# Patient Record
Sex: FEMALE | Race: BLACK OR AFRICAN AMERICAN
[De-identification: names, ages, dates, MRNs, and addresses within clinical notes are randomized per-mention and may not be internally consistent; named-entity substitution may affect disease eponyms.]

---

## 2017-03-29 ENCOUNTER — HOSPITAL ENCOUNTER (EMERGENCY)
Dept: HOSPITAL 17 - HOBED | Age: 27
Discharge: HOME | End: 2017-03-29
Payer: COMMERCIAL

## 2017-03-29 VITALS — DIASTOLIC BLOOD PRESSURE: 63 MMHG | SYSTOLIC BLOOD PRESSURE: 122 MMHG | HEART RATE: 99 BPM

## 2017-03-29 VITALS — RESPIRATION RATE: 20 BRPM

## 2017-03-29 VITALS — TEMPERATURE: 97.9 F

## 2017-03-29 DIAGNOSIS — F12.10: ICD-10-CM

## 2017-03-29 DIAGNOSIS — N85.8: ICD-10-CM

## 2017-03-29 DIAGNOSIS — O26.899: Primary | ICD-10-CM

## 2017-03-29 LAB
AMPHETAMINE, URINE: (no result)
ANION GAP SERPL CALC-SCNC: 9 MEQ/L (ref 5–15)
BACTERIA #/AREA URNS HPF: (no result) /HPF
BARBITURATES, URINE: (no result)
BUN SERPL-MCNC: 8 MG/DL (ref 7–18)
CHLAMYDIA PCR: DETECTED
CHLORIDE SERPL-SCNC: 107 MEQ/L (ref 98–107)
COCAINE UR-MCNC: (no result) NG/ML
COLOR UR: YELLOW
COMMENT (UR): (no result)
CULTURE IF INDICATED: (no result)
ERYTHROCYTE [DISTWIDTH] IN BLOOD BY AUTOMATED COUNT: 15.4 % (ref 11.6–17.2)
GFR SERPLBLD BASED ON 1.73 SQ M-ARVRAT: 149 ML/MIN (ref 89–?)
GLUCOSE UR STRIP-MCNC: (no result) MG/DL
HCO3 BLD-SCNC: 23.2 MEQ/L (ref 21–32)
HCT VFR BLD CALC: 31.8 % (ref 35–46)
HGB UR QL STRIP: (no result)
KETONES UR STRIP-MCNC: (no result) MG/DL
MCH RBC QN AUTO: 27.8 PG (ref 27–34)
MCHC RBC AUTO-ENTMCNC: 33.5 % (ref 32–36)
MCV RBC AUTO: 82.9 FL (ref 80–100)
MUCOUS THREADS #/AREA URNS LPF: (no result) /LPF
NEISSERIA PCR: NOT DETECTED
NITRITE UR QL STRIP: (no result)
PLATELET # BLD: 200 TH/MM3 (ref 150–450)
POTASSIUM SERPL-SCNC: 3.5 MEQ/L (ref 3.5–5.1)
RBC # BLD AUTO: 3.84 MIL/MM3 (ref 4–5.3)
REVIEW FLAG: (no result)
RUBELLA STATUS: (no result)
RUBV IGG SER-ACNC: 12.7 IU/ML (ref 10–500)
SODIUM SERPL-SCNC: 139 MEQ/L (ref 136–145)
SP GR UR STRIP: 1.03 (ref 1–1.03)
SQUAMOUS #/AREA URNS HPF: 20 /HPF (ref 0–5)
WBC # BLD AUTO: 8.6 TH/MM3 (ref 4–11)

## 2017-03-29 PROCEDURE — 86762 RUBELLA ANTIBODY: CPT

## 2017-03-29 PROCEDURE — 86592 SYPHILIS TEST NON-TREP QUAL: CPT

## 2017-03-29 PROCEDURE — 80074 ACUTE HEPATITIS PANEL: CPT

## 2017-03-29 PROCEDURE — 85027 COMPLETE CBC AUTOMATED: CPT

## 2017-03-29 PROCEDURE — 81001 URINALYSIS AUTO W/SCOPE: CPT

## 2017-03-29 PROCEDURE — 76805 OB US >/= 14 WKS SNGL FETUS: CPT

## 2017-03-29 PROCEDURE — 80048 BASIC METABOLIC PNL TOTAL CA: CPT

## 2017-03-29 PROCEDURE — 87491 CHLMYD TRACH DNA AMP PROBE: CPT

## 2017-03-29 PROCEDURE — 87591 N.GONORRHOEAE DNA AMP PROB: CPT

## 2017-03-29 PROCEDURE — 99284 EMERGENCY DEPT VISIT MOD MDM: CPT

## 2017-03-29 PROCEDURE — 87210 SMEAR WET MOUNT SALINE/INK: CPT

## 2017-03-29 PROCEDURE — G0481 DRUG TEST DEF 8-14 CLASSES: HCPCS

## 2017-03-29 PROCEDURE — 86703 HIV-1/HIV-2 1 RESULT ANTBDY: CPT

## 2017-03-29 PROCEDURE — 84112 EVAL AMNIOTIC FLUID PROTEIN: CPT

## 2017-03-29 PROCEDURE — 80307 DRUG TEST PRSMV CHEM ANLYZR: CPT

## 2017-03-29 NOTE — PD
HPI


Date Seen:  Mar 29, 2017 (Kel Connor MD R1)





Travel History


International Travel<30 Days:  No


Contact w/Intl Traveler<30Days:  No


Known Affected Area:  No (Kel Connor MD R1)





History of Present Illness


HPI


Patient is a 26 year old  with no prior prenatal care presents to OB ED 

with reports of LOF benita. 1 hour ago. She was seen here at [facility] on 2016, 

had a TVUS and was diagnosed with an IUP at that time dating at benita. 6 weeks. 

She was also treated at that time for asymptomatic bacteriuria urine culture 

growing E. coli she was given one dose of IV Rocephin and sent home with a 5-

day course of Macrobid. She reports +FM. Reports irregular contractions. Denies 

urinary symptoms. She also does not have a primary care physician. She is 

unsure of her LMP, she believes it to be sometime in August of last year.


Para:  2


:  3 (Kel Connor MD R1)





History


Past Medical History


Medical History:  Denies Significant Hx (Kel Connor MD R1)





Obstetric History


Obstetric History





Per EMR, prior CSx1 in , 39 weeks gestation, emergent  performed 

due to fetal bradycardia


2nd pregnancy ending in  2016 delivered at term (Kel Connor MD 

R1)





Past Surgical History


*** Narrative Surgical


Hx of CSx1


Cholecystectomy 2015 (Kel Connor MD R1)





Family History


Family History:  Negative (Kel Connor MD)





Social History


Alcohol Use:  No


Tobacco Use:  No


Substance Abuse:  Yes (Admits to marijuana use almost daily throughout pregnancy

) (Kel Connor MD R1)





Allergies-Medications


(Allergen,Severity, Reaction):  


Coded Allergies:  


     No Known Allergies (Verified , 16)


Home Meds


Active Scripts


Nitrofurantoin Monohydrate Macrocrystals (Macrobid)100 Mg Qst064 Mg PO BID  5 

Days  Ref 0


   Prov:Kirk Callahan MD         16


Prenatal Vitamin (Calna)1 Tab Tab1 Tab PO DAILY  90 Days  Ref 3


   Prov:Kirk Callahan MD         16





Review of Systems


Except as stated in HPI:  all other systems reviewed are Neg (Kel Connor MD R1)





Physical Exam


Narrative


GENERAL: Well-nourished, well-developed patient.


SKIN: Warm and dry.


HEAD: Normocephalic and atraumatic.


EYES: No scleral icterus. No injection or drainage. 


ENT: No nasal drainage noted. Mucous membranes pink. Airway patent.


NECK: Supple, trachea midline. No JVD.


CARDIOVASCULAR: Regular rate and rhythm without murmurs, gallops, or rubs. 


RESPIRATORY: Breath sounds equal bilaterally. No accessory muscle use.


ABDOMEN/GI: Abdomen soft, non-tender, bowel sounds present, no rebound, no 

guarding 


   Gravid to 24 weeks size


GENITOURINARY: 


   External Genitalia: [-]


   Cervix: [-]


   Dilatation: [-]          


   Effacement: [-]          


   Station: [-]  


   Presentation: [-]        


   Membranes: [-]


   Uterine Contractions: [-]


FHT's: 10-minute strip reviewed


   Category: I   


   Baseline: 150s 


   Reactive: yes   


   Variability: mod  


   Decels: none 


EXTREMITIES: No cyanosis or edema.


BACK: Nontender without obvious deformity. No CVA tenderness.


NEUROLOGICAL: Awake and alert. Motor and sensory grossly within normal limits. 

Normal speech. (Kel Connor MD R1)





Data


Data


Vital Signs Reviewed:  Yes (Kel Connor MD R1)





[facility]


Medical Record Reviewed:  Yes


Plan


Patient is a 26 year old  with no prior prenatal care presented with report 

of LOF now found to be at 24/1 weeks gestation based on ultrasound obtained 

today.





1. IUP


- Ultrasound obtained at OB diagnostics showing cervical length of 3.5 cm, 

posterior placenta, efw of 700 gm, GA at 24/1 weeks with an ENA of 2017


- Category I fetal tracing


- No contractions on tocometer


- Cervix is closed


- Amnisure negative


- UA shows moderate leukocyte esterase, negative nitrite, few bacteria, 30 

protein, trace ketones, neg glucose, culture not indicated


- Urine toxicology screen is positive for THC, extended screen still pending


- Wet prep negative


- GC & chlamydia obtained


- Prenatal labs ordered, pending


- Patient is instructed to follow up at [facility] for continued 

prenatal care





sdw Dr. Taveras (Kel Connor MD R1)


Attending Attestation


The exam, history, and the medical decision-making described in the above note 

were completed with the assistance of the resident provider. I reviewed and 

agree with the findings presented.  I attest that I had a face-to-face 

encounter with the patient on the same day, and personally performed and 

documented my assessment and findings in the medical record. (Kassandra Taveras MD)





Diagnosis


Diagnosis:  


 Primary Impression:  


 Intrauterine pregnancy


Disposition:  01 DISCHARGE HOME


Condition:  Stable


Patient Instructions:  General Instructions








Kel Connor MD R1 Mar 29, 2017 14:05


Kassandra Taveras MD Mar 29, 2017 16:54

## 2017-03-30 LAB — RAPID PLASMA REAGIN SCREEN: (no result)

## 2017-04-04 LAB
BATH SALTS (MDPV) UR: (no result)
ECSTASY (MDMA) UR: (no result)
HEROIN (6-ACETYLMORPHINE) UR: (no result)
K2 SPICE UR: (no result)
METHADONE UR QL SCN: (no result)
OXYCODONE (PERCODAN): (no result)
PCP UR-MCNC: (no result) UG/L

## 2017-06-06 ENCOUNTER — HOSPITAL ENCOUNTER (EMERGENCY)
Dept: HOSPITAL 17 - HOBED | Age: 27
Discharge: HOME | End: 2017-06-06
Payer: COMMERCIAL

## 2017-06-06 VITALS — HEART RATE: 123 BPM

## 2017-06-06 VITALS — HEART RATE: 110 BPM

## 2017-06-06 VITALS
DIASTOLIC BLOOD PRESSURE: 66 MMHG | HEART RATE: 98 BPM | SYSTOLIC BLOOD PRESSURE: 101 MMHG | TEMPERATURE: 98.5 F | RESPIRATION RATE: 16 BRPM

## 2017-06-06 VITALS — HEART RATE: 112 BPM

## 2017-06-06 VITALS — HEART RATE: 120 BPM

## 2017-06-06 VITALS — HEIGHT: 60 IN | BODY MASS INDEX: 29.45 KG/M2 | WEIGHT: 150 LBS

## 2017-06-06 VITALS — HEART RATE: 108 BPM

## 2017-06-06 VITALS — HEART RATE: 119 BPM

## 2017-06-06 VITALS — HEART RATE: 126 BPM

## 2017-06-06 VITALS — HEART RATE: 122 BPM

## 2017-06-06 VITALS — HEART RATE: 114 BPM

## 2017-06-06 VITALS — HEART RATE: 121 BPM

## 2017-06-06 DIAGNOSIS — O23.13: Primary | ICD-10-CM

## 2017-06-06 DIAGNOSIS — O09.33: ICD-10-CM

## 2017-06-06 DIAGNOSIS — Z3A.34: ICD-10-CM

## 2017-06-06 LAB
ALP SERPL-CCNC: 189 U/L (ref 45–117)
ALT SERPL-CCNC: 15 U/L (ref 10–53)
AMPHETAMINE, URINE: (no result)
ANION GAP SERPL CALC-SCNC: 12 MEQ/L (ref 5–15)
AST SERPL-CCNC: 19 U/L (ref 15–37)
BACTERIA #/AREA URNS HPF: (no result) /HPF
BARBITURATES, URINE: (no result)
BASOPHILS # BLD AUTO: 0 TH/MM3 (ref 0–0.2)
BASOPHILS NFR BLD: 0.5 % (ref 0–2)
BILIRUB SERPL-MCNC: 0.3 MG/DL (ref 0.2–1)
BUN SERPL-MCNC: 7 MG/DL (ref 7–18)
CHLAMYDIA PCR: DETECTED
CHLORIDE SERPL-SCNC: 103 MEQ/L (ref 98–107)
COCAINE UR-MCNC: (no result) NG/ML
COLOR UR: YELLOW
COMMENT (UR): (no result)
CULTURE IF INDICATED: (no result)
EOSINOPHIL # BLD: 0 TH/MM3 (ref 0–0.4)
EOSINOPHIL NFR BLD: 0.6 % (ref 0–4)
ERYTHROCYTE [DISTWIDTH] IN BLOOD BY AUTOMATED COUNT: 14.3 % (ref 11.6–17.2)
GFR SERPLBLD BASED ON 1.73 SQ M-ARVRAT: 149 ML/MIN (ref 89–?)
GLUCOSE UR STRIP-MCNC: (no result) MG/DL
HCO3 BLD-SCNC: 22.4 MEQ/L (ref 21–32)
HCT VFR BLD CALC: 31.6 % (ref 35–46)
HEMO FLAGS: (no result)
HGB UR QL STRIP: (no result)
HYALINE CASTS #/AREA URNS LPF: 1 /LPF
KETONES UR STRIP-MCNC: 150 MG/DL
LYMPHOCYTES # BLD AUTO: 1.1 TH/MM3 (ref 1–4.8)
LYMPHOCYTES NFR BLD AUTO: 12.5 % (ref 9–44)
MCH RBC QN AUTO: 26.4 PG (ref 27–34)
MCHC RBC AUTO-ENTMCNC: 32.8 % (ref 32–36)
MCV RBC AUTO: 80.4 FL (ref 80–100)
MONOCYTES NFR BLD: 13.8 % (ref 0–8)
MUCOUS THREADS #/AREA URNS LPF: (no result) /LPF
NEISSERIA PCR: NOT DETECTED
NEUTROPHILS # BLD AUTO: 6.3 TH/MM3 (ref 1.8–7.7)
NEUTROPHILS NFR BLD AUTO: 72.6 % (ref 16–70)
NITRITE UR QL STRIP: (no result)
PLATELET # BLD: 185 TH/MM3 (ref 150–450)
POTASSIUM SERPL-SCNC: 3.8 MEQ/L (ref 3.5–5.1)
RBC # BLD AUTO: 3.93 MIL/MM3 (ref 4–5.3)
SODIUM SERPL-SCNC: 137 MEQ/L (ref 136–145)
SP GR UR STRIP: 1.03 (ref 1–1.03)
SQUAMOUS #/AREA URNS HPF: 19 /HPF (ref 0–5)
WBC # BLD AUTO: 8.6 TH/MM3 (ref 4–11)

## 2017-06-06 PROCEDURE — 81001 URINALYSIS AUTO W/SCOPE: CPT

## 2017-06-06 PROCEDURE — 80053 COMPREHEN METABOLIC PANEL: CPT

## 2017-06-06 PROCEDURE — 86900 BLOOD TYPING SEROLOGIC ABO: CPT

## 2017-06-06 PROCEDURE — 85025 COMPLETE CBC W/AUTO DIFF WBC: CPT

## 2017-06-06 PROCEDURE — G0481 DRUG TEST DEF 8-14 CLASSES: HCPCS

## 2017-06-06 PROCEDURE — 96372 THER/PROPH/DIAG INJ SC/IM: CPT

## 2017-06-06 PROCEDURE — 87591 N.GONORRHOEAE DNA AMP PROB: CPT

## 2017-06-06 PROCEDURE — 96360 HYDRATION IV INFUSION INIT: CPT

## 2017-06-06 PROCEDURE — 80307 DRUG TEST PRSMV CHEM ANLYZR: CPT

## 2017-06-06 PROCEDURE — 59025 FETAL NON-STRESS TEST: CPT

## 2017-06-06 PROCEDURE — 87491 CHLMYD TRACH DNA AMP PROBE: CPT

## 2017-06-06 PROCEDURE — 87086 URINE CULTURE/COLONY COUNT: CPT

## 2017-06-06 PROCEDURE — 86850 RBC ANTIBODY SCREEN: CPT

## 2017-06-06 PROCEDURE — 99284 EMERGENCY DEPT VISIT MOD MDM: CPT

## 2017-06-06 PROCEDURE — 86901 BLOOD TYPING SEROLOGIC RH(D): CPT

## 2017-06-06 NOTE — PD
HPI


Chief Complaint


Contractions.


Date Seen:  2017


Travel History


International Travel<30 Days:  No


Contact w/Intl Traveler<30Days:  No





History of Present Illness


HPI


Patient is a 26 year old  at 34-0/7 weeks gestation based on second 

trimester US who presents today with contractions.  Contractions started at 5:

30 this morning and have become progressively more frequent and strong. She now 

notes contractions every 1-3 minutes lasting 30 seconds. She denies any vaginal 

bleeding or discharge. No gush or leaking of fluid. Positive fetal movement. No 

prenatal care, but she was seen in the [facility] ED twice during this pregnancy.  

She has a history of marijuana use and states that she last smoke marijuana 

yesterday.





History


Past Medical History


Medical History:  Denies Significant Hx





Obstetric History


Obstetric History


 s/p  x 1 and  x 1





Past Surgical History


*** Narrative Surgical


Cholecystectomy








Family History


Family History:  Negative





Social History


Alcohol Use:  No


Tobacco Use:  No


Substance Abuse:  Yes (patient denies, but UDS from March significant for 

marijuana use)





Allergies-Medications


(Allergen,Severity, Reaction):  


Coded Allergies:  


     No Known Allergies (Verified , 16)


Home Meds


Discontinued Scripts


Nitrofurantoin Monohydrate Macrocrystals (Macrobid)100 Mg Imd136 Mg PO BID  5 

Days  Ref 0


   Prov:Kirk Callahan MD         16


Prenatal Vitamin (Calna)1 Tab Tab1 Tab PO DAILY  90 Days  Ref 3


   Prov:Kirk Callahan MD         16





Review of Systems


Except as stated in HPI:  all other systems reviewed are Neg


General / Constitutional:  No: Fever, Chills


Eyes:  No: Visual changes


HENT:  No: Headaches


Cardiovascular:  No: Chest Pain or Discomfort


Respiratory:  No: Short of Breath


Gastrointestinal:  Abdominal Pain


Genitourinary:  Pelvic Pain,  No: Discharge, Vaginal Bleeding


Musculoskeletal:  No: Edema


Neurologic:  No: Headache


Psychiatric:  Substance Abuse





Physical Exam


Narrative


GENERAL: Well-nourished, well-developed patient.


SKIN: Warm and dry.


HEAD: Normocephalic and atraumatic.


EYES: No scleral icterus. No injection or drainage. 


ENT: No nasal drainage noted. Mucous membranes pink. Airway patent.


NECK: Supple, trachea midline. No JVD.


CARDIOVASCULAR: Regular rate and rhythm without murmurs, gallops, or rubs. 


RESPIRATORY: Breath sounds equal bilaterally. No accessory muscle use.


ABDOMEN/GI: Abdomen soft, non-tender, bowel sounds present, no rebound, no 

guarding 


   Gravid to 34 weeks size


GENITOURINARY: 


   External Genitalia: intact and normal in appearance


   BUS glands: normal


   Cervix: midposition


   Dilatation: 2          


   Effacement: 50          


   Station: -3  


   Presentation: vertex        


   Membranes: intact


   Uterine Contractions: q4-6min


FHT's: 


   Category: I   


   Baseline: 155   


   Reactive: +   


   Variability: moderate  


   Decels: none  


EXTREMITIES: No cyanosis or edema.


BACK: Nontender without obvious deformity. No CVA tenderness.


NEUROLOGICAL: Awake and alert. Motor and sensory grossly within normal limits. 

Normal speech.





Data


Data


Vital Signs Reviewed:  Yes


Orders





 Vital Signs (Adult) .ON ADMISSION (17 15:05)


^ Labor Status (17 15:05)


^ Non Stress Test (17 15:05)


^ Hydration (17 15:05)





MDM


Medical Record Reviewed:  Yes


Narrative Course / MDM


26 year old  at 34-0/7 weeks gestation.





1. IUP- Category I tracing, reassuring.


2.  contractions- monitor toco/fht. Tocolysis with Procardia (

Terbutaline contraindicated with maternal tachycardia) and IV fluids. 


3. No prenatal care- prenatal labs drawn during March ED visit. Patient was 

positive for Chlamydia in March, but states she was never treated.  Will treat 

with Rocephin and Azithromycin and repeat urine GC and chlamydia PCR.


4. Somnolence- Vitals stable. Obtain CBC, CMP, UA, UDS.





dw Dr. Valdez and Dr. VERNON Mendez R1





Addendum:


UA significant for UTI, will treat with Bactrim DS BID x 3 days.


CBC significant for Hgb 10.4, will treat with Ferrous Fumarate 325mg PO BID


UDS, CMP wnl


Contractions have subsided with Procardia. Patient will be discharged to home. 

Follow-up with the [facility], Dr. Guaman.


Diagnosis


Diagnosis:  


 Primary Impression:  


 Acute cystitis during pregnancy in third trimester


 Additional Impression:  


  contractions


Disposition:   DISCHARGE HOME


Condition:  Stable


Scripts


Ferrous Fumarate 324 Mg [id number] Mg PO BID  #60 TAB  Ref 3


   Prov:Yenny Guaman MD R2         17 


Sulfamethoxazole-Trimethoprim 800-160 Mg Tab1 Tab PO BID  #6 TAB  Ref 0


   Prov:Yenny Guaman MD R2         17








Yenny Guaman MD R2 2017 15:08
no

## 2017-06-12 LAB
BATH SALTS (MDPV) UR: (no result)
ECSTASY (MDMA) UR: (no result)
GABAPENTIN UR: (no result)
HEROIN (6-ACETYLMORPHINE) UR: (no result)
HYDROMORPHONE U: (no result)
K2 SPICE UR: (no result)
METHADONE UR QL SCN: (no result)
OXYCODONE (PERCODAN): (no result)
PCP UR-MCNC: (no result) UG/L

## 2017-06-22 ENCOUNTER — HOSPITAL ENCOUNTER (EMERGENCY)
Dept: HOSPITAL 17 - HOBED | Age: 27
Discharge: HOME | End: 2017-06-22
Payer: COMMERCIAL

## 2017-06-22 VITALS — RESPIRATION RATE: 18 BRPM

## 2017-06-22 DIAGNOSIS — O34.219: ICD-10-CM

## 2017-06-22 DIAGNOSIS — Z3A.36: ICD-10-CM

## 2017-06-22 DIAGNOSIS — O60.03: Primary | ICD-10-CM

## 2017-06-22 LAB
COLOR UR: YELLOW
COMMENT (UR): (no result)
CULTURE IF INDICATED: (no result)
GLUCOSE UR STRIP-MCNC: (no result) MG/DL
HGB UR QL STRIP: (no result)
KETONES UR STRIP-MCNC: (no result) MG/DL
MUCOUS THREADS #/AREA URNS LPF: (no result) /LPF
NITRITE UR QL STRIP: (no result)
SP GR UR STRIP: 1.04 (ref 1–1.03)
SQUAMOUS #/AREA URNS HPF: 6 /HPF (ref 0–5)

## 2017-06-22 PROCEDURE — 96375 TX/PRO/DX INJ NEW DRUG ADDON: CPT

## 2017-06-22 PROCEDURE — 96372 THER/PROPH/DIAG INJ SC/IM: CPT

## 2017-06-22 PROCEDURE — 99284 EMERGENCY DEPT VISIT MOD MDM: CPT

## 2017-06-22 PROCEDURE — 81001 URINALYSIS AUTO W/SCOPE: CPT

## 2017-06-22 PROCEDURE — 96374 THER/PROPH/DIAG INJ IV PUSH: CPT

## 2017-06-22 NOTE — PD
HPI


Chief Complaint


Contraction pain


Date Seen:  2017


Travel History


International Travel<30 Days:  No


Contact w/Intl Traveler<30Days:  No


Known Affected Area:  No





History of Present Illness


HPI


The patient is 26-year-old black female  previous  at 36 weeks by 

24 week ultrasound who has very limited prenatal care presenting brought in by 

the ambulance for contractions.  She denies bleeding or ruptured membranes.  

Baby is active.  Fetal heart rate tracing is reactive.  And she is matty 

irregularly.  We have the copy of the 24 week ultrasound done at the end of 

March here and also that time prenatal labs drawn which was also normal limits 

her chlamydia was positive and that was treated.


Para:  2


:  3





History


Obstetric History


Obstetric History


1  and 1  delivery


She has no prenatal care with this baby   only the ER visits and ultrasound 

done through [facility]





Past Surgical History


*** Narrative Surgical


1 





Social History


Alcohol Use:  No


Tobacco Use:  No


Substance Abuse:  No





Allergies-Medications


(Allergen,Severity, Reaction):  


Coded Allergies:  


     No Known Allergies (Verified , 16)


Home Meds


Active Scripts


Ferrous Fumarate 324 Mg [id number] Mg PO BID  #60 TAB  Ref 3


   Prov:Yenny Guaman MD R2         17


Sulfamethoxazole-Trimethoprim 800-160 Mg Tab1 Tab PO BID  #6 TAB  Ref 0


   Prov:Yenny Guaman MD R2         17





Review of Systems


General / Constitutional:  No: Fever, Weight Gain, Chills, Other


Eyes:  No: Diploplia, Blurred Vision, Visual changes, Pain, Photophobia


HENT:  No: Headaches, Vertigo, Lightheadedness


Cardiovascular:  No: Irregular Rhythm, Chest Pain or Discomfort, Palpitations, 

Tachycardia, Syncope, Varicosities, Edema, Cyanosis


Respiratory:  No: Cough, Short of Breath, Other


Gastrointestinal:  Abdominal Pain,  No: Nausea, Vomiting, Diarrhea


Genitourinary:  No: Decreased Urinary Output, Oliguria


Musculoskeletal:  No: Limited ROM, Weakness, Cramping, Edema, Pain


Skin:  No Rash, No Itching, No Dryness, No Lumps, No Change in Pigmentation, No 

Change in Nails, No Alopecia, No Lesions


Neurologic:  No: Weakness, Dizziness, Syncope, Focal Abnormalities, 

Coordination Problem, Headache, Slurred Speech, Seizures


Psychiatric:  No: Depression, Suicidal Ideations, Homicidal Ideation


Endocrine:  No: Heat Intolerance, Cold Intolerance, Polydipsia, Polyuria, Other





Physical Exam


Narrative


GENERAL: Well-nourished, well-developed patient.


SKIN: Warm and dry.


HEAD: Normocephalic and atraumatic.


EYES: No scleral icterus. No injection or drainage. 


ENT: No nasal drainage noted. Mucous membranes pink. Airway patent.


NECK: Supple, trachea midline. No JVD.


CARDIOVASCULAR: Regular rate and rhythm without murmurs, gallops, or rubs. 


RESPIRATORY: Breath sounds equal bilaterally. No accessory muscle use.


BREASTS: Bilateral exam showed no masses , no retractions, no nipple discharge.


ABDOMEN/GI: Abdomen soft, non-tender, bowel sounds present, no rebound, no 

guarding 


   Gravid to [36-] weeks size


   Fundal Height: [35-]


GENITOURINARY: 


   External Genitalia: intact and normal in appearance


   BUS glands: [-]


   Cervix: [-]


   Dilatation: [1-2-]          


   Effacement: [-70]          


   Station: [-3]  


   Presentation: [-vtx]        


   Membranes: [intact  ]


   Uterine Contractions: [Irregular-]


FHT's: 


   Category: [1-]   


   Baseline: [133-]   


   Reactive: [-yes]   


   Variability: [-mod]  


   Decels: [-0]  


EXTREMITIES: No cyanosis or edema.


BACK: Nontender without obvious deformity. No CVA tenderness.


NEUROLOGICAL: Awake and alert. Motor and sensory grossly within normal limits. 

Five out of 5 muscle strength in all muscle groups. Normal speech.





Data


Data


Orders





 Vital Signs (Adult) .ON ADMISSION (17 19:08)


^ Labor Status (17 19:08)


Urinalysis - C+S If Indicated (17 19:08)


Lactated Ringer's 1000 Ml Inj (Lr 1000 M (17 19:08)


Ondansetron Inj (Zofran Inj) (17 19:15)


Terbutaline Inj (Brethine Inj) (17 19:15)


Fentanyl Inj (Fentanyl Inj) (17 19:15)





MDM


Interpretation(s)


This patient is 26-year-old black female  36 weeks previous  and 

previous  who presents combining of contractions.  She was brought in by 

EMS and has been on the monitor here she's having irregular contractions.  Her 

membranes are intact she's had no bleeding cervix is 1-/ -3


Plan


Plan to give patient a liter of IV fluid subcutaneous terbutaline and 1 dose of 

fentanyl IV to see if this can tocolyse what little contractions we are seeing.

  She is encouraged at home to increase bedrest use Tylenol liberally drink 

plenty of fluids and use a heating pad or hot bath for comfort's.  She is 

planning a repeat  delivery


Diagnosis


Diagnosis:  


 Primary Impression:  


  contractions


 Additional Impression:  


 Previous  section


Disposition:  01 DISCHARGE HOME


Condition:  Stable








Anoop Valdez II, MD 2017 19:23

## 2017-07-14 ENCOUNTER — HOSPITAL ENCOUNTER (INPATIENT)
Dept: HOSPITAL 17 - HOBED | Age: 27
LOS: 2 days | Discharge: HOME | End: 2017-07-16
Attending: OBSTETRICS & GYNECOLOGY | Admitting: OBSTETRICS & GYNECOLOGY
Payer: COMMERCIAL

## 2017-07-14 VITALS — RESPIRATION RATE: 16 BRPM

## 2017-07-14 VITALS
HEART RATE: 60 BPM | SYSTOLIC BLOOD PRESSURE: 117 MMHG | RESPIRATION RATE: 18 BRPM | DIASTOLIC BLOOD PRESSURE: 76 MMHG | TEMPERATURE: 97.6 F

## 2017-07-14 VITALS — HEART RATE: 61 BPM | SYSTOLIC BLOOD PRESSURE: 117 MMHG | DIASTOLIC BLOOD PRESSURE: 71 MMHG

## 2017-07-14 VITALS — RESPIRATION RATE: 20 BRPM

## 2017-07-14 VITALS — HEART RATE: 68 BPM | SYSTOLIC BLOOD PRESSURE: 122 MMHG | DIASTOLIC BLOOD PRESSURE: 81 MMHG

## 2017-07-14 VITALS
DIASTOLIC BLOOD PRESSURE: 68 MMHG | SYSTOLIC BLOOD PRESSURE: 110 MMHG | RESPIRATION RATE: 18 BRPM | HEART RATE: 57 BPM | TEMPERATURE: 97.9 F

## 2017-07-14 VITALS — SYSTOLIC BLOOD PRESSURE: 107 MMHG | DIASTOLIC BLOOD PRESSURE: 75 MMHG | HEART RATE: 73 BPM

## 2017-07-14 VITALS — DIASTOLIC BLOOD PRESSURE: 78 MMHG | HEART RATE: 65 BPM | SYSTOLIC BLOOD PRESSURE: 123 MMHG

## 2017-07-14 VITALS — DIASTOLIC BLOOD PRESSURE: 79 MMHG | SYSTOLIC BLOOD PRESSURE: 116 MMHG | HEART RATE: 56 BPM

## 2017-07-14 VITALS — SYSTOLIC BLOOD PRESSURE: 99 MMHG | DIASTOLIC BLOOD PRESSURE: 72 MMHG | HEART RATE: 78 BPM

## 2017-07-14 VITALS — RESPIRATION RATE: 18 BRPM

## 2017-07-14 DIAGNOSIS — Z3A.39: ICD-10-CM

## 2017-07-14 DIAGNOSIS — O34.219: Primary | ICD-10-CM

## 2017-07-14 DIAGNOSIS — O09.33: ICD-10-CM

## 2017-07-14 LAB
AMPHETAMINE, URINE: (no result)
BACTERIA #/AREA URNS HPF: (no result) /HPF
BARBITURATES, URINE: (no result)
BASOPHILS # BLD AUTO: 0 TH/MM3 (ref 0–0.2)
BASOPHILS NFR BLD: 0.4 % (ref 0–2)
CHLAMYDIA PCR: NOT DETECTED
COCAINE UR-MCNC: (no result) NG/ML
COLOR UR: YELLOW
COMMENT (UR): (no result)
CULTURE IF INDICATED: (no result)
EOSINOPHIL # BLD: 0 TH/MM3 (ref 0–0.4)
EOSINOPHIL NFR BLD: 0.5 % (ref 0–4)
ERYTHROCYTE [DISTWIDTH] IN BLOOD BY AUTOMATED COUNT: 16.5 % (ref 11.6–17.2)
GLUCOSE UR STRIP-MCNC: (no result) MG/DL
GROUP B STREP PCR: NEGATIVE
HCT VFR BLD CALC: 36.1 % (ref 35–46)
HEMO FLAGS: (no result)
HGB UR QL STRIP: (no result)
KETONES UR STRIP-MCNC: (no result) MG/DL
LYMPHOCYTES # BLD AUTO: 2.5 TH/MM3 (ref 1–4.8)
LYMPHOCYTES NFR BLD AUTO: 30 % (ref 9–44)
MCH RBC QN AUTO: 24.5 PG (ref 27–34)
MCHC RBC AUTO-ENTMCNC: 30.6 % (ref 32–36)
MCV RBC AUTO: 80.2 FL (ref 80–100)
MONOCYTES NFR BLD: 8.7 % (ref 0–8)
MUCOUS THREADS #/AREA URNS LPF: (no result) /LPF
NEISSERIA PCR: NOT DETECTED
NEUTROPHILS # BLD AUTO: 5.1 TH/MM3 (ref 1.8–7.7)
NEUTROPHILS NFR BLD AUTO: 60.4 % (ref 16–70)
NITRITE UR QL STRIP: (no result)
PLATELET # BLD: 202 TH/MM3 (ref 150–450)
RBC # BLD AUTO: 4.51 MIL/MM3 (ref 4–5.3)
SP GR UR STRIP: 1.03 (ref 1–1.03)
SQUAMOUS #/AREA URNS HPF: 4 /HPF (ref 0–5)
WBC # BLD AUTO: 8.5 TH/MM3 (ref 4–11)

## 2017-07-14 PROCEDURE — 88307 TISSUE EXAM BY PATHOLOGIST: CPT

## 2017-07-14 PROCEDURE — 87491 CHLMYD TRACH DNA AMP PROBE: CPT

## 2017-07-14 PROCEDURE — 85025 COMPLETE CBC W/AUTO DIFF WBC: CPT

## 2017-07-14 PROCEDURE — 87186 SC STD MICRODIL/AGAR DIL: CPT

## 2017-07-14 PROCEDURE — 87081 CULTURE SCREEN ONLY: CPT

## 2017-07-14 PROCEDURE — 80307 DRUG TEST PRSMV CHEM ANLYZR: CPT

## 2017-07-14 PROCEDURE — 87150 DNA/RNA AMPLIFIED PROBE: CPT

## 2017-07-14 PROCEDURE — 87591 N.GONORRHOEAE DNA AMP PROB: CPT

## 2017-07-14 PROCEDURE — 90715 TDAP VACCINE 7 YRS/> IM: CPT

## 2017-07-14 PROCEDURE — 81001 URINALYSIS AUTO W/SCOPE: CPT

## 2017-07-14 PROCEDURE — 87077 CULTURE AEROBIC IDENTIFY: CPT

## 2017-07-14 PROCEDURE — 87086 URINE CULTURE/COLONY COUNT: CPT

## 2017-07-14 PROCEDURE — G0481 DRUG TEST DEF 8-14 CLASSES: HCPCS

## 2017-07-14 RX ADMIN — NITROFURANTOIN MONOHYDRATE AND NITROFURANTOIN MACROCRYSTALLINE SCH MG: 75; 25 CAPSULE ORAL at 18:29

## 2017-07-14 RX ADMIN — IBUPROFEN PRN MG: 600 TABLET, FILM COATED ORAL at 23:33

## 2017-07-14 NOTE — PD.OB.DELI
Delivery Date:  2017


Anesthesia:  None


Episiotomy:  None


Vaginal Delivery:  Normal


Presentation:  Occiput anterior


Nuchal Cord:  None


Delayed cord clamping (45 sec):  Yes


Infant:  Female


One Minute APGAR:  9


Five Minute APGAR:  9


Birth Weight:  2825g


Placenta:  Spontaneous delivery


Laceration:  No lacerations


Additional Information


25 yo  @ 39w3d.  Limited prenatal care.  Presented at 8-9cm.  Desired  

DIPAK.  CAT I FHT.  SROM with clear fluid at time of delivery.  Uncomplicated 

.  Placenta spontaneous and intact, grossly normal but small, sent to 

pathology.  No lacerations.  EBL 100ml.








Yadi Rodarte MD 2017 12:32

## 2017-07-14 NOTE — HHI.HP
History & Physical


H&P


HPI


Chief Complaint


Labor


Date Seen:  2017


Travel History


International Travel<30 Days:  No


Contact w/Intl Traveler<30Days:  No


Known Affected Area:  No





History of Present Illness


HPI


27YO black female  previous  at 39 weeks by 24 week ultrasound 

who has very limited prenatal care presenting brought in by the ambulance for 

contractions and 9 cm dilated.  She denies bleeding or ruptured membranes.  

Baby is active.  Fetal heart rate tracing is reactive.  And she is matty 

irregularly.  We have the copy of the 24 week ultrasound done at the end of 

March here and also that time prenatal labs drawn which was also normal limits 

her chlamydia was positive and that was treated.


Para:  2


:  3





History


Obstetric History


Obstetric History


1  and 1  delivery


She has no prenatal care with this baby   only the ER visits and ultrasound 

done through [facility]





Past Surgical History


*** Narrative Surgical


1 





Social History


Alcohol Use:  No


Tobacco Use:  No


Substance Abuse:  No





Allergies-Medications


(Allergen,Severity, Reaction):  


Coded Allergies:  


     No Known Allergies (Verified , 16)


Home Meds


Active Scripts


Ferrous Fumarate 324 Mg Pwd000 Mg PO BID  #60 TAB  Ref 3


   Prov:Yenny Guaman MD R2         17





Review of Systems


General / Constitutional:  No: Fever, Weight Gain, Chills, Other; yes: pain and 

contractions


Eyes:  No: Diploplia, Blurred Vision, Visual changes, Pain, Photophobia


HENT:  No: Headaches, Vertigo, Lightheadedness


Cardiovascular:  No: Irregular Rhythm, Chest Pain or Discomfort, Palpitations, 

Tachycardia, Syncope, Varicosities, Edema, Cyanosis


Respiratory:  No: Cough, Short of Breath, Other


Gastrointestinal:  Abdominal Pain,  No: Nausea, Vomiting, Diarrhea


Genitourinary:  No: Decreased Urinary Output, Oliguria


Musculoskeletal:  No: Limited ROM, Weakness, Cramping, Edema, Pain


Skin:  No Rash, No Itching, No Dryness, No Lumps, No Change in Pigmentation, No 

Change in Nails, No Alopecia, No Lesions


Neurologic:  No: Weakness, Dizziness, Syncope, Focal Abnormalities, 

Coordination Problem, Headache, Slurred Speech, Seizures


Psychiatric:  No: Depression, Suicidal Ideations, Homicidal Ideation


Endocrine:  No: Heat Intolerance, Cold Intolerance, Polydipsia, Polyuria, Other





Physical Exam


Narrative


GENERAL: Well-nourished, well-developed patient.


SKIN: Warm and dry.


HEAD: Normocephalic and atraumatic.


EYES: No scleral icterus. No injection or drainage. 


ENT: No nasal drainage noted. Mucous membranes pink. Airway patent.


NECK: Supple, trachea midline. 


CARDIOVASCULAR: RRR without murmur, gallop, or rub. 


RESPIRATORY: Breath sounds equal bilaterally w/no increased WOB. No accessory 

muscle use.


ABDOMEN/GI: Abdomen soft, non-tender, bowel sounds present, no rebound, no 

guarding 


   Gravid to [36-] weeks size


   Fundal Height: [35-]


GENITOURINARY: 


   External Genitalia: intact and normal in appearance


   Cervix: [anterior]


   Dilatation: 8-9          


   Effacement: 100          


   Station: 0  


   Presentation: vertex        


   Membranes: intact  


   Uterine Contractions: Irregular-


FHT's: 


   Category: [1-]   


   Baseline: [135 with accels to 155]   


   Reactive: [-yes]   


   Variability: [-mod]  


   Decels: [-0]  


EXTREMITIES: No cyanosis or edema.


NEUROLOGICAL: Awake and alert. Motor and sensory grossly within normal limits. 

Normal speech.





Data


Data


Orders





 Vital Signs (Adult) .ON ADMISSION (17 11:07)


^ Labor Status (17 11:07)


^ Non Stress Test (17 11:07)


Vital Signs (Adult) .ON ADMISSION (17 11:11)


^ Labor Status (17 11:11)


Urinalysis - C+S If Indicated (17 11:11)


Gc And Chlamydia Pcr (17 11:11)


Group B Strep Pcr (Rapid) (17 11:11)


Admit To Inpatient (17 )


Vital Signs (Adult) .Per protocol (17 11:17)


Activity Oob Ad Christine (17 11:17)


Fetal Heart (17 11:17)


Amnioinfusion (17 11:17)


Urinary Catheter Management .ONCE (17 11:17)


Diet Liquid (17 Lunch)


Lactated Ringer's 1000 Ml Inj (Lr 1000 M (17 11:17)


Lactated Ringer's 1000 Ml Inj (Lr 1000 M (17 11:17)


Sodium Chlorid 0.9% 500 Ml Inj (Ns 500 M (17 11:30)


Sodium Chlor 0.9% 1000 Ml Inj (Ns 1000 M (17 11:37)


Lidocaine 1% Inj (50 Ml) (Xylocaine 1% I (17 11:30)


Citric Acid-Sodium Citrate Liq (Bicitra (17 11:30)


Fentanyl Inj (Fentanyl Inj) (17 11:30)


Fentanyl Inj (Fentanyl Inj) (17 11:30)


Complete Blood Count With Diff (17 11:17)


Hold Clot (17 11:17)


Abo/Rh Blood Type (17 11:)


Resp Oxygen Non Rebreathe Mask (17 )


^ Epidural / Intrathecal Infus (17 11:17)


Oxytocin 30 Units-500ml Premix (Pitocin (17 11:30)


Lidocaine 1% Inj (50 Ml) (Xylocaine 1% I (17 11:30)


Light Mineral Oil (Muri-Lube Oil) (17 11:30)


Inpatient Certification (17 )








A/P


Interpretation(s)


27YO black female  39 weeks previous  and previous  who 

presents in labor with contractions and 8-9cm dilated/100%/0station/vertex and 

limited PNC.  She was brought in by EMS and has been on the monitor here she's 

having irregular contractions.  Her membranes are intact. She is planning a 

repeat  delivery





1. IUP


- Category 1 tracing


- Irregular contractions 3-5 minutes apart


- Admit for L&D


- IVF


- Pain control


- Trial of labor


-Treated for Chlamydia previously


- GC and Chlamydia PCR, UA (Tang Dumont MD R1)


H&P


27 yo  @ 39w3d by 24 week US (6 weeks us documented viability but no EDC 

given).  No prenatal care this pregnancy.  Patient was seen in the [facility] several 

times and prenatal labs and US were obtained.  She was treated for 2 UTIs this 

pregnancy and Chlamydia twice with documented treatment.  She had a previous C-

section and .  She presented to the [facility] by EMS today with c/o onset of UC 

at 7am.  No ROM, LOF, VB.  +FM.  Upon evaluation she was 8-9cm with bulging 

membranes.  An US confirmed vertex presentation.  FHT were CAT I.  She desires 

a  DIPAK.  The risks of uterine rupture were reviewed including emergency c-

section, bleeding, additional surgery, fetal injury.  A rapid GBS was obtained 

and a BLAIR for chlamydia.  UA sent.  





Patient was seen and evaluated with Dr. Santiago and Dr. Dumont. (Yadi Rodarte MD)








Tang Dumont MD R1 2017 12:16


Yadi Rodarte MD 2017 12:29

## 2017-07-15 VITALS
DIASTOLIC BLOOD PRESSURE: 65 MMHG | SYSTOLIC BLOOD PRESSURE: 105 MMHG | HEART RATE: 66 BPM | TEMPERATURE: 98.1 F | RESPIRATION RATE: 14 BRPM

## 2017-07-15 VITALS
SYSTOLIC BLOOD PRESSURE: 121 MMHG | DIASTOLIC BLOOD PRESSURE: 74 MMHG | HEART RATE: 53 BPM | RESPIRATION RATE: 17 BRPM | TEMPERATURE: 98.2 F

## 2017-07-15 RX ADMIN — IBUPROFEN PRN MG: 600 TABLET, FILM COATED ORAL at 10:31

## 2017-07-15 RX ADMIN — NITROFURANTOIN MONOHYDRATE AND NITROFURANTOIN MACROCRYSTALLINE SCH MG: 75; 25 CAPSULE ORAL at 14:18

## 2017-07-15 RX ADMIN — IBUPROFEN PRN MG: 600 TABLET, FILM COATED ORAL at 20:38

## 2017-07-15 NOTE — HHI.OB
Subjective


Post Partum Day:  1


Remarks


26 year old female  s/p NVD at 39 wks gestation, PPD 1. AFVSS. Patient 

reports she is feeling well. Bleeding is decreasing and pain is well-

controlled.  She is breast and formula feeding and bonding well with baby.  

Ambulating without difficulties. She is tolerating a diet without nausea or 

vomiting. She has not had a bowel movement. She has passed gas. Denies chest 

pain, dysuria, shortness of breath, or calf pain. (Jaime Santiago MD R1)





Objective


Vitals/I&O





Vital Signs








  Date Time  Temp Pulse Resp B/P Pulse Ox O2 Delivery O2 Flow Rate FiO2


 


7/15/17 08:00 98.2 53 17 121/74    


 


17 19:30  60  117/76    


 


17 19:30 97.6  18     


 


17 14:45 97.9 57 18 110/68    


 


17 13:55   20     


 


17 13:45  56  116/79    


 


17 13:30  65  123/78    


 


17 13:21   18     


 


17 13:15  68  122/81    


 


17 13:00  61  117/71    


 


17 12:45  73  107/75    


 


17 12:40   16     


 


17 12:31  78  99/72    








Objective Remarks


GENERAL: Well-nourished, well-developed patient.


CARDIOVASCULAR: Regular rate and rhythm without murmurs, gallops, or rubs. 


RESPIRATORY: Breath sounds equal bilaterally. No accessory muscle use.


ABDOMEN/GI: Abdomen soft, non-tender. 


   Fundus: Firm, non-tender at umbilicus.


GENITOURINARY: Light to moderate bleeding.


EXTREMITIES: No cyanosis or edema, non-tender, without signs of DVT.


Medications and IVs





 Current Medications








 Medications


  (Trade)  Dose


 Ordered  Sig/Buster


 Route  Start Time


 Stop Time Status Last Admin


 


  (NS Flush)  2 ml  BID


 IV FLUSH  17 12:30


     


 


 


  (NS Flush)  2 ml  UNSCH  PRN


 IV FLUSH  17 12:30


     


 


 


  (Tylenol)  650 mg  Q4H  PRN


 PO  17 12:30


     


 


 


  (Motrin)  600 mg  Q6H  PRN


 PO  17 12:30


    17 23:33


 


 


  (Americaine 20%


 Top Spr)  1 spray  Q4H  PRN


 TOPICAL  17 12:30


     


 


 


  (Tucks Pads)  1 applic  QID  PRN


 TOPICAL  17 12:30


     


 


 


  (Cinthia-Colace)  2 tab  Q12H  PRN


 PO  17 12:30


     


 


 


  (Ambien)  5 mg  HS  PRN


 PO  17 12:30


     


 


 


  (Mag-Al Plus


 Susp Liq)  15 ml  Q8H  PRN


 PO  17 12:30


     


 


 


  (Zofran  Odt)  4 mg  Q6H  PRN


 PO  17 12:30


     


 


 


  (Macrobid)  100 mg  BIDPC


 PO  17 18:00


    17 18:29


 





 (Jaime Santiago MD R1)





Assessment/Plan


Assessment and Plan


27 yo  female s/p , PPD 1





- AFVSS


- UA suggestive of UTI, culture pending


   - Macrobid started  evening


- Continue routine postpartum care


- Motrin PRN pain


- Encourage OOB


- Pelvic rest x 6 wks


- Contraception: Depo-Provera given


- Anticipate D/C  (Jaime Santiago MD R1)


Attending Attestation


PPD #1 s/p 


Doing well


Treated for UTI, asymptomatic


Continue PP care and observation


Patient seen and examined, d/w Dr. Santiago/Dr. Sousa (Yadi Rodarte MD)








Jaime Santiago MD R1 Jul 15, 2017 08:44


Yadi Rodarte MD Jul 15, 2017 09:36

## 2017-07-16 VITALS
SYSTOLIC BLOOD PRESSURE: 116 MMHG | TEMPERATURE: 98.3 F | DIASTOLIC BLOOD PRESSURE: 77 MMHG | RESPIRATION RATE: 18 BRPM | HEART RATE: 57 BPM

## 2017-07-16 RX ADMIN — IBUPROFEN PRN MG: 600 TABLET, FILM COATED ORAL at 09:18

## 2017-07-16 NOTE — HHI.OB
Subjective


Post Partum Day:  2


Remarks


26 year old female  s/p NVD at 39 wks gestation, PPD 2. AFVSS. Patient 

reports she is feeling well. Bleeding is decreasing and pain is well-

controlled.  She is breast and formula feeding and bonding well with baby.  

Ambulating without difficulties. She is tolerating a diet without nausea or 

vomiting. She has not had a bowel movement. She has passed gas. Denies chest 

pain, dysuria, shortness of breath, or calf pain.





Objective


Vitals/I&O





Vital Signs








  Date Time  Temp Pulse Resp B/P Pulse Ox O2 Delivery O2 Flow Rate FiO2


 


17 08:00 98.3       


 


17 08:00  57 18 116/77    


 


7/15/17 19:42 98.1 66 14 105/65    








Objective Remarks


GENERAL: Well-nourished, well-developed patient.


CARDIOVASCULAR: Regular rate and rhythm without murmurs, gallops, or rubs. 


RESPIRATORY: Breath sounds equal bilaterally. No accessory muscle use.


ABDOMEN/GI: Abdomen soft, non-tender. 


   Fundus: Firm, non-tender at umbilicus.


GENITOURINARY: Light to moderate bleeding.


EXTREMITIES: No cyanosis or edema, non-tender, without signs of DVT.


Medications and IVs





 Current Medications








 Medications


  (Trade)  Dose


 Ordered  Sig/Buster


 Route  Start Time


 Stop Time Status Last Admin


 


  (NS Flush)  2 ml  BID


 IV FLUSH  17 12:30


     


 


 


  (NS Flush)  2 ml  UNSCH  PRN


 IV FLUSH  17 12:30


     


 


 


  (Tylenol)  650 mg  Q4H  PRN


 PO  17 12:30


     


 


 


  (Motrin)  600 mg  Q6H  PRN


 PO  17 12:30


    7/15/17 20:38


 


 


  (Americaine 20%


 Top Spr)  1 spray  Q4H  PRN


 TOPICAL  17 12:30


     


 


 


  (Tucks Pads)  1 applic  QID  PRN


 TOPICAL  17 12:30


     


 


 


  (Cinthia-Colace)  2 tab  Q12H  PRN


 PO  17 12:30


     


 


 


  (Ambien)  5 mg  HS  PRN


 PO  17 12:30


     


 


 


  (Mag-Al Plus


 Susp Liq)  15 ml  Q8H  PRN


 PO  17 12:30


     


 


 


  (Zofran  Odt)  4 mg  Q6H  PRN


 PO  17 12:30


     


 


 


  (Macrobid)  100 mg  BIDPC


 PO  17 09:00


     


 











Assessment/Plan


Assessment and Plan


27 yo  female s/p , PPD 2





- AFVSS


- UA suggestive of UTI, culture pending on 17


   - Macrobid started  evening - will continue at discharge to complete 5 

days of therapy


- Continue routine postpartum care


- Motrin PRN pain


- Encourage OOB


- Pelvic rest x 6 wks


- Contraception: Depo-Provera given, patient would like a tubal ligation. 

Encouraged to discuss with OB provider after discharge


- Anticipate D/C 





Discussed with Jennifer Ibarra MD R1 2017 08:14

## 2017-07-16 NOTE — HHI.DCPOC
Discharge Care Plan


Diagnosis:  


(1) , delivered


(2) Acute cystitis during pregnancy in third trimester


Report Symptoms to Your Doctor


-Temperature above 100.5 degrees


-Redness, of incision or excessive or foul smelling drainage


-Unusual pain or calf pain


-Increased vaginal bleeding


-Painful or difficulty urinating


-Feelings of extreme sadness or anxiety after 2 weeks


Goals to Promote Your Health


* To prevent worsening of your condition and complications


* To maintain your health at the optimal level


Directions to Meet Your Goals


*** Take your medications as prescribed


*** Follow your dietary instruction


*** Follow activity as directed


*** Ensure plenty of rest for recovery


*** Drink fluids for hydration








*** Keep your appointments as scheduled


*** Take your immunizations and boosters as scheduled


*** If your symptoms worsen call your PCP, if no PCP go to Urgent Care Center 

or Emergency Room***


*** Smoking is Dangerous to Your Health. Avoid second hand smoke***


***Call the 24-hour crisis hotline for domestic abuse at 1-969.313.6713***








Jennifer Johnson MD R1 2017 09:48

## 2018-02-05 ENCOUNTER — HOSPITAL ENCOUNTER (INPATIENT)
Dept: HOSPITAL 17 - NEPD | Age: 28
LOS: 7 days | Discharge: HOME | DRG: 885 | End: 2018-02-12
Attending: PSYCHIATRY & NEUROLOGY | Admitting: PSYCHIATRY & NEUROLOGY
Payer: COMMERCIAL

## 2018-02-05 VITALS
RESPIRATION RATE: 24 BRPM | DIASTOLIC BLOOD PRESSURE: 81 MMHG | HEART RATE: 65 BPM | TEMPERATURE: 98.3 F | SYSTOLIC BLOOD PRESSURE: 158 MMHG | OXYGEN SATURATION: 98 %

## 2018-02-05 VITALS — HEIGHT: 60 IN | BODY MASS INDEX: 32.38 KG/M2 | WEIGHT: 164.91 LBS

## 2018-02-05 DIAGNOSIS — R07.89: ICD-10-CM

## 2018-02-05 DIAGNOSIS — Z81.8: ICD-10-CM

## 2018-02-05 DIAGNOSIS — Z62.810: ICD-10-CM

## 2018-02-05 DIAGNOSIS — Z23: ICD-10-CM

## 2018-02-05 DIAGNOSIS — F20.9: Primary | ICD-10-CM

## 2018-02-05 LAB
ALBUMIN SERPL-MCNC: 3.9 GM/DL (ref 3.4–5)
ALP SERPL-CCNC: 131 U/L (ref 45–117)
ALT SERPL-CCNC: 21 U/L (ref 10–53)
AST SERPL-CCNC: 10 U/L (ref 15–37)
BASOPHILS # BLD AUTO: 0 TH/MM3 (ref 0–0.2)
BASOPHILS NFR BLD: 0.5 % (ref 0–2)
BILIRUB SERPL-MCNC: 0.2 MG/DL (ref 0.2–1)
BUN SERPL-MCNC: 11 MG/DL (ref 7–18)
CALCIUM SERPL-MCNC: 10.2 MG/DL (ref 8.5–10.1)
CHLORIDE SERPL-SCNC: 106 MEQ/L (ref 98–107)
CREAT SERPL-MCNC: 0.77 MG/DL (ref 0.5–1)
EOSINOPHIL # BLD: 0 TH/MM3 (ref 0–0.4)
EOSINOPHIL NFR BLD: 0.6 % (ref 0–4)
ERYTHROCYTE [DISTWIDTH] IN BLOOD BY AUTOMATED COUNT: 13.7 % (ref 11.6–17.2)
GFR SERPLBLD BASED ON 1.73 SQ M-ARVRAT: 109 ML/MIN (ref 89–?)
GLUCOSE SERPL-MCNC: 101 MG/DL (ref 74–106)
HCO3 BLD-SCNC: 25.7 MEQ/L (ref 21–32)
HCT VFR BLD CALC: 37.8 % (ref 35–46)
HGB BLD-MCNC: 12.7 GM/DL (ref 11.6–15.3)
LYMPHOCYTES # BLD AUTO: 3.5 TH/MM3 (ref 1–4.8)
LYMPHOCYTES NFR BLD AUTO: 45.4 % (ref 9–44)
MCH RBC QN AUTO: 27.1 PG (ref 27–34)
MCHC RBC AUTO-ENTMCNC: 33.5 % (ref 32–36)
MCV RBC AUTO: 81.1 FL (ref 80–100)
MONOCYTE #: 0.5 TH/MM3 (ref 0–0.9)
MONOCYTES NFR BLD: 6.6 % (ref 0–8)
NEUTROPHILS # BLD AUTO: 3.6 TH/MM3 (ref 1.8–7.7)
NEUTROPHILS NFR BLD AUTO: 46.9 % (ref 16–70)
PLATELET # BLD: 276 TH/MM3 (ref 150–450)
PMV BLD AUTO: 8.3 FL (ref 7–11)
PROT SERPL-MCNC: 8 GM/DL (ref 6.4–8.2)
RBC # BLD AUTO: 4.66 MIL/MM3 (ref 4–5.3)
SODIUM SERPL-SCNC: 138 MEQ/L (ref 136–145)
WBC # BLD AUTO: 7.7 TH/MM3 (ref 4–11)

## 2018-02-05 PROCEDURE — 80053 COMPREHEN METABOLIC PANEL: CPT

## 2018-02-05 PROCEDURE — 93005 ELECTROCARDIOGRAM TRACING: CPT

## 2018-02-05 PROCEDURE — 80061 LIPID PANEL: CPT

## 2018-02-05 PROCEDURE — 85025 COMPLETE CBC W/AUTO DIFF WBC: CPT

## 2018-02-05 PROCEDURE — 80307 DRUG TEST PRSMV CHEM ANLYZR: CPT

## 2018-02-05 PROCEDURE — 83036 HEMOGLOBIN GLYCOSYLATED A1C: CPT

## 2018-02-05 PROCEDURE — 80048 BASIC METABOLIC PNL TOTAL CA: CPT

## 2018-02-05 PROCEDURE — 84443 ASSAY THYROID STIM HORMONE: CPT

## 2018-02-05 PROCEDURE — 84703 CHORIONIC GONADOTROPIN ASSAY: CPT

## 2018-02-05 PROCEDURE — 90686 IIV4 VACC NO PRSV 0.5 ML IM: CPT

## 2018-02-05 PROCEDURE — 99285 EMERGENCY DEPT VISIT HI MDM: CPT
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
## 2018-02-06 VITALS
RESPIRATION RATE: 18 BRPM | HEART RATE: 66 BPM | OXYGEN SATURATION: 100 % | TEMPERATURE: 98 F | SYSTOLIC BLOOD PRESSURE: 124 MMHG | DIASTOLIC BLOOD PRESSURE: 86 MMHG

## 2018-02-06 VITALS
HEART RATE: 62 BPM | OXYGEN SATURATION: 100 % | RESPIRATION RATE: 17 BRPM | TEMPERATURE: 98 F | SYSTOLIC BLOOD PRESSURE: 128 MMHG | DIASTOLIC BLOOD PRESSURE: 61 MMHG

## 2018-02-06 VITALS
RESPIRATION RATE: 24 BRPM | DIASTOLIC BLOOD PRESSURE: 80 MMHG | TEMPERATURE: 98.3 F | HEART RATE: 45 BPM | SYSTOLIC BLOOD PRESSURE: 145 MMHG | OXYGEN SATURATION: 98 %

## 2018-02-06 VITALS
OXYGEN SATURATION: 100 % | RESPIRATION RATE: 17 BRPM | DIASTOLIC BLOOD PRESSURE: 61 MMHG | SYSTOLIC BLOOD PRESSURE: 128 MMHG | TEMPERATURE: 98 F | HEART RATE: 62 BPM
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
## 2018-02-06 NOTE — EKG
Date Performed: 02/05/2018       Time Performed: 21:29:26

 

PTAGE:      27 years

 

EKG:      Sinus Bradycardia When compared to previous tracing, heart rate has slowed. ABNORMAL RHYTHM
 ECG

 

PREVIOUS TRACING       : 09/22/2015 20.24

 

DOCTOR:   Jeannine Wang  Interpretating Date/Time  02/06/2018 15:15:07

## 2018-02-07 VITALS
DIASTOLIC BLOOD PRESSURE: 71 MMHG | SYSTOLIC BLOOD PRESSURE: 113 MMHG | RESPIRATION RATE: 16 BRPM | TEMPERATURE: 97.9 F | HEART RATE: 67 BPM | OXYGEN SATURATION: 99 %

## 2018-02-07 LAB
BUN SERPL-MCNC: 9 MG/DL (ref 7–18)
CALCIUM SERPL-MCNC: 9.7 MG/DL (ref 8.5–10.1)
CHLORIDE SERPL-SCNC: 104 MEQ/L (ref 98–107)
CHOLEST SERPL-MCNC: 151 MG/DL (ref 120–200)
CHOLESTEROL/ HDL RATIO: 1.89 RATIO
CREAT SERPL-MCNC: 0.72 MG/DL (ref 0.5–1)
GFR SERPLBLD BASED ON 1.73 SQ M-ARVRAT: 118 ML/MIN (ref 89–?)
GLUCOSE SERPL-MCNC: 107 MG/DL (ref 74–106)
HBA1C MFR BLD: 5.5 % (ref 4.3–6)
HCO3 BLD-SCNC: 29.2 MEQ/L (ref 21–32)
HDLC SERPL-MCNC: 79.7 MG/DL (ref 40–60)
LDLC SERPL-MCNC: 55 MG/DL (ref 0–99)
SODIUM SERPL-SCNC: 139 MEQ/L (ref 136–145)
TRIGL SERPL-MCNC: 81 MG/DL (ref 42–150)

## 2018-02-07 RX ADMIN — OLANZAPINE SCH MG: 5 TABLET, FILM COATED ORAL at 21:43
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
## 2018-02-08 VITALS
OXYGEN SATURATION: 99 % | HEART RATE: 77 BPM | SYSTOLIC BLOOD PRESSURE: 103 MMHG | TEMPERATURE: 98.1 F | RESPIRATION RATE: 16 BRPM | DIASTOLIC BLOOD PRESSURE: 56 MMHG

## 2018-02-08 VITALS
HEART RATE: 100 BPM | DIASTOLIC BLOOD PRESSURE: 72 MMHG | RESPIRATION RATE: 16 BRPM | OXYGEN SATURATION: 99 % | SYSTOLIC BLOOD PRESSURE: 116 MMHG | TEMPERATURE: 98.1 F

## 2018-02-08 RX ADMIN — OLANZAPINE SCH MG: 5 TABLET, FILM COATED ORAL at 22:09

## 2018-02-08 NOTE — HHI.PYPN
Subjective


Remarks


Patient seen for follow-up, chart reviewed.  Discussion with nursing staff 

reported that the patient noted to be visible on the unit, not endorsing AH or 

any thoughts of self injurious behavior.  Patient was found lying on hospital 

bed, asleep but able to wake up for interview. Patient states feeling "good", 

reports sleeping well, continues with less depression and AH and feels 

medication is helpful.  Currently denies SI or delusions at this time.





Review of Systems


Except as stated in HPI:  all other systems reviewed are Neg





Mental Status Examination


Appearance:  Appropriate


Consciousness:  Alert


Orientation:  x4


Motor Activity:  Normal gait


Speech:  Hesitant, Slow


Language:  Adequate


Fund of Knowledge:  Poor


Attention and Concentration:  Easily Distracted


Memory:  Impaired


Mood:  Sad


Affect:  Sad


Thought Process & Associations:  Intact


Thought Content:  Hallucinations


Hallucination Type:  Auditory (command type, derogatory)


Delusion Type:  None


Suicidal Ideation:  Yes (denies today)


Suicidal Plan:  No


Suicidal Intention:  No


Homicidal Ideation:  No


Homicidal Plan:  No


Homicidal Intention:  No


Insight:  Fair


Judgment:  Adequate





Results


Vitals/IOs





Vital Signs








  Date Time  Temp Pulse Resp B/P (MAP) Pulse Ox O2 Delivery O2 Flow Rate FiO2


 


2/8/18 17:27 98.1 100 16 116/72 (87) 99   


 


2/6/18 14:27      Room Air  











Assessment & Plan


Problem List:  


(1) Psychosis


ICD Codes:  F29 - Unspecified psychosis not due to a substance or known 

physiological condition


Status:  Acute


Assessment & Plan


Patient with decreased depressed mood and suicidal ideations along with 

auditory hallucinations.  Continue current treatment as patient appears to be 

responding.  Continue monitor mood and behavior.  Discharge planning in 

progress.


Justification for Cont. Inpt.


At risk for further decompensation if at lower level of care.


Discharge Planning


To be determined





Problem Qualifiers





(1) Psychosis:  


Qualified Codes:  F20.9 - Schizophrenia, unspecified








Gama Ulrich MD Feb 8, 2018 17:31

## 2018-02-09 VITALS
SYSTOLIC BLOOD PRESSURE: 113 MMHG | TEMPERATURE: 98.1 F | OXYGEN SATURATION: 98 % | DIASTOLIC BLOOD PRESSURE: 67 MMHG | HEART RATE: 65 BPM | RESPIRATION RATE: 16 BRPM

## 2018-02-09 RX ADMIN — OLANZAPINE SCH MG: 5 TABLET, FILM COATED ORAL at 21:16

## 2018-02-09 NOTE — HHI.PYPN
Subjective


Remarks


Patient is here for follow, chart reviewed.  Discussion nursing staff reported 

the patient has been noted to be visible on the unit with staff.  Patient was 

found lying in hospital bed noted to be calm and cooperative.  Patient states 

that she had been feeling "good" stating that she has been sleeping well, 

continues to report feeling depressed but less so today.  Patient states that 

she has been having worsening auditory hallucinations and reported decreased 

suicide ideations at this time.  Patient states that once she is better she 

will be discharged back to her mother and sisters residence.  Patient during 

interview appeared to be somewhat internally preoccupied but denying any 

perception of service at time of interview.





Review of Systems


Except as stated in HPI:  all other systems reviewed are Neg





Mental Status Examination


Appearance:  Appropriate


Consciousness:  Alert


Orientation:  x4


Motor Activity:  Normal gait


Speech:  Hesitant, Slow


Language:  Adequate


Fund of Knowledge:  Poor


Attention and Concentration:  Easily Distracted


Memory:  Impaired


Mood:  Sad


Affect:  Sad


Thought Process & Associations:  Intact


Thought Content:  Hallucinations


Hallucination Type:  Auditory (Denies today but appearing somewhat internally 

preoccupied)


Delusion Type:  None


Suicidal Ideation:  Yes (denies today)


Suicidal Plan:  No


Suicidal Intention:  No


Homicidal Ideation:  No


Homicidal Plan:  No


Homicidal Intention:  No


Insight:  Fair


Judgment:  Adequate





Results


Vitals/IOs





Vital Signs








  Date Time  Temp Pulse Resp B/P (MAP) Pulse Ox O2 Delivery O2 Flow Rate FiO2


 


2/9/18 05:17 98.1 65 16 113/67 (82) 98   


 


2/6/18 14:27      Room Air  











Assessment & Plan


Problem List:  


(1) Psychosis


ICD Codes:  F29 - Unspecified psychosis not due to a substance or known 

physiological condition


Status:  Acute


Assessment & Plan


Patient at this time reporting improved mood, continue to report feeling 

depressed along with vague suicidal ideations and denying any auditory 

hallucinations but noted to be somewhat internally preoccupied at times.  

Patient appears to be responding to treatment, we will continue current regimen 

for now.  Continue to monitor mood and behavior.  Collateral information from 

family to assess patient's baseline as well as for discharge planning.  

Discharge planning in progress


Justification for Cont. Inpt.


At risk for further decompensation if at lower level of care


Discharge Planning


Patient to return back to her residence once psychiatrically stable.





Problem Qualifiers





(1) Psychosis:  


Qualified Codes:  F20.9 - Schizophrenia, unspecified








Gama Ulrich MD Feb 9, 2018 15:54

## 2018-02-10 VITALS
RESPIRATION RATE: 16 BRPM | OXYGEN SATURATION: 98 % | HEART RATE: 83 BPM | DIASTOLIC BLOOD PRESSURE: 56 MMHG | TEMPERATURE: 97.7 F | SYSTOLIC BLOOD PRESSURE: 119 MMHG

## 2018-02-10 VITALS
TEMPERATURE: 98.1 F | OXYGEN SATURATION: 99 % | DIASTOLIC BLOOD PRESSURE: 58 MMHG | HEART RATE: 82 BPM | SYSTOLIC BLOOD PRESSURE: 118 MMHG | RESPIRATION RATE: 18 BRPM

## 2018-02-10 RX ADMIN — OLANZAPINE SCH MG: 5 TABLET, FILM COATED ORAL at 21:00

## 2018-02-10 NOTE — HHI.PYPN
Subjective


Remarks


Pt seen and discussed with staff. She has been isolating self to her room. AH 

persists, but she reports some decrease in frequency. No self injurious 

behaviors today. No SI/HI. She has been compliant wt medications and denies 

side effecdts.





Mental Status Examination


Appearance:  Appropriate


Consciousness:  Alert


Orientation:  x4


Motor Activity:  Normal gait


Speech:  Hesitant, Slow


Language:  Adequate


Fund of Knowledge:  Poor


Attention and Concentration:  Easily Distracted


Memory:  Unremarkable


Mood:  Sad


Affect:  Sad


Thought Process & Associations:  Intact


Thought Content:  Hallucinations


Hallucination Type:  Auditory


Delusion Type:  None


Suicidal Ideation:  No (denies today)


Suicidal Plan:  No


Suicidal Intention:  No


Homicidal Ideation:  No


Homicidal Plan:  No


Homicidal Intention:  No


Insight:  Fair


Judgment:  Impulsive





Results


Vitals/IOs





Vital Signs








  Date Time  Temp Pulse Resp B/P (MAP) Pulse Ox O2 Delivery O2 Flow Rate FiO2


 


2/10/18 06:14 97.7 83 16 119/56 (77) 98   


 


2/6/18 14:27      Room Air  











Assessment & Plan


Problem List:  


(1) Psychosis


ICD Codes:  F29 - Unspecified psychosis not due to a substance or known 

physiological condition


Status:  Acute


Assessment & Plan


PT improving. Continue current tx plan.  Estimated LOS:  days


Justification for Cont. Inpt.


risk of decompensation





Problem Qualifiers





(1) Psychosis:  


Qualified Codes:  F20.9 - Schizophrenia, unspecified








Chelly Panda MD Feb 10, 2018 15:28

## 2018-02-11 VITALS
SYSTOLIC BLOOD PRESSURE: 108 MMHG | TEMPERATURE: 98.2 F | RESPIRATION RATE: 17 BRPM | HEART RATE: 90 BPM | DIASTOLIC BLOOD PRESSURE: 65 MMHG

## 2018-02-11 VITALS
TEMPERATURE: 98.2 F | DIASTOLIC BLOOD PRESSURE: 65 MMHG | RESPIRATION RATE: 17 BRPM | SYSTOLIC BLOOD PRESSURE: 108 MMHG | HEART RATE: 90 BPM

## 2018-02-11 VITALS
TEMPERATURE: 98.1 F | SYSTOLIC BLOOD PRESSURE: 124 MMHG | RESPIRATION RATE: 18 BRPM | OXYGEN SATURATION: 97 % | DIASTOLIC BLOOD PRESSURE: 76 MMHG | HEART RATE: 84 BPM

## 2018-02-11 RX ADMIN — OLANZAPINE SCH MG: 5 TABLET, FILM COATED ORAL at 20:35

## 2018-02-11 NOTE — HHI.PYPN
Subjective


Remarks


Pt seen and discussed with staff. She reports that she continues to hear voices 

but they are "not bad voices". She is compliant with medications and is 

cooperative with care. No SI/HI





Mental Status Examination


Appearance:  Appropriate


Consciousness:  Alert


Orientation:  x4


Motor Activity:  Normal gait


Speech:  Hesitant, Slow


Language:  Adequate


Fund of Knowledge:  Poor


Attention and Concentration:  Easily Distracted


Memory:  Unremarkable


Mood:  Sad


Affect:  Sad


Thought Process & Associations:  Intact


Thought Content:  Hallucinations


Hallucination Type:  Auditory


Delusion Type:  None


Suicidal Ideation:  No (denies today)


Suicidal Plan:  No


Suicidal Intention:  No


Homicidal Ideation:  No


Homicidal Plan:  No


Homicidal Intention:  No


Insight:  Fair


Judgment:  Impulsive





Results


Vitals/IOs





Vital Signs








  Date Time  Temp Pulse Resp B/P (MAP) Pulse Ox O2 Delivery O2 Flow Rate FiO2


 


2/11/18 06:17 98.2 90 17 108/65 (79)    


 


2/10/18 18:00     99   














Intake and Output   


 


 2/11/18 2/11/18 2/12/18





 08:00 16:00 00:00


 


Intake Total 240 ml 240 ml 


 


Balance 240 ml 240 ml 











Assessment & Plan


Problem List:  


(1) Psychosis


ICD Codes:  F29 - Unspecified psychosis not due to a substance or known 

physiological condition


Status:  Acute


Assessment & Plan


Pt improving. Continue current tx plan.  Estimated LOS:  days


Justification for Cont. Inpt.


impairments in reality testing





Problem Qualifiers





(1) Psychosis:  


Qualified Codes:  F20.9 - Schizophrenia, unspecified








Chelly Panda MD Feb 11, 2018 13:55

## 2018-02-12 VITALS
RESPIRATION RATE: 16 BRPM | HEART RATE: 91 BPM | SYSTOLIC BLOOD PRESSURE: 95 MMHG | DIASTOLIC BLOOD PRESSURE: 59 MMHG | OXYGEN SATURATION: 96 % | TEMPERATURE: 98 F
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
